# Patient Record
Sex: MALE | Race: WHITE | Employment: STUDENT | URBAN - METROPOLITAN AREA
[De-identification: names, ages, dates, MRNs, and addresses within clinical notes are randomized per-mention and may not be internally consistent; named-entity substitution may affect disease eponyms.]

---

## 2017-09-18 ENCOUNTER — OFFICE VISIT (OUTPATIENT)
Dept: INTERNAL MEDICINE CLINIC | Facility: CLINIC | Age: 22
End: 2017-09-18

## 2017-09-18 VITALS
DIASTOLIC BLOOD PRESSURE: 81 MMHG | WEIGHT: 148 LBS | SYSTOLIC BLOOD PRESSURE: 127 MMHG | HEIGHT: 74 IN | HEART RATE: 82 BPM | BODY MASS INDEX: 18.99 KG/M2

## 2017-09-18 DIAGNOSIS — Z00.00 ANNUAL PHYSICAL EXAM: Primary | ICD-10-CM

## 2017-09-18 PROCEDURE — 99385 PREV VISIT NEW AGE 18-39: CPT | Performed by: INTERNAL MEDICINE

## 2017-09-18 NOTE — PATIENT INSTRUCTIONS
Your physical today was normal.  Please obtain blood tests soon when you can. Continue to eat healthy and exercise regularly. Remember to get a flu shot every fall.

## 2017-09-18 NOTE — H&P
Starr Ramirez is a 19year old male who presents for a complete physical exam.   HPI:   He brings in a form for completion requiring biometric screening to include height weight waist circumference blood pressure fasting glucose and cholesterol profile.   H or chest pain  GI: No anorexia heartburn dysphagia nausea vomiting abdominal pain diarrhea constipation or rectal bleeding  : No urinary frequency dysuria or hematuria  MUSCULOSKELETAL: No leg swelling  NEURO: No headaches    EXAM:   GENERAL: Pleasant ma

## 2017-09-22 ENCOUNTER — MED REC SCAN ONLY (OUTPATIENT)
Dept: INTERNAL MEDICINE CLINIC | Facility: CLINIC | Age: 22
End: 2017-09-22

## 2017-10-02 ENCOUNTER — TELEPHONE (OUTPATIENT)
Dept: FAMILY MEDICINE CLINIC | Facility: CLINIC | Age: 22
End: 2017-10-02

## 2017-10-02 NOTE — TELEPHONE ENCOUNTER
Contacted pt and informed him that biometric screening form has not been faxed b/c he has opted out of that section, and just needs to have form faxed over.   Faxed over form to [employer] with confirmation

## 2018-12-01 ENCOUNTER — OFFICE VISIT (OUTPATIENT)
Dept: INTERNAL MEDICINE CLINIC | Facility: CLINIC | Age: 23
End: 2018-12-01
Payer: COMMERCIAL

## 2018-12-01 VITALS
WEIGHT: 147 LBS | HEIGHT: 74 IN | TEMPERATURE: 98 F | DIASTOLIC BLOOD PRESSURE: 79 MMHG | RESPIRATION RATE: 16 BRPM | BODY MASS INDEX: 18.87 KG/M2 | HEART RATE: 80 BPM | SYSTOLIC BLOOD PRESSURE: 121 MMHG

## 2018-12-01 DIAGNOSIS — J01.90 ACUTE NON-RECURRENT SINUSITIS, UNSPECIFIED LOCATION: Primary | ICD-10-CM

## 2018-12-01 PROCEDURE — 99213 OFFICE O/P EST LOW 20 MIN: CPT | Performed by: INTERNAL MEDICINE

## 2018-12-01 PROCEDURE — 99212 OFFICE O/P EST SF 10 MIN: CPT | Performed by: INTERNAL MEDICINE

## 2018-12-01 RX ORDER — AMOXICILLIN AND CLAVULANATE POTASSIUM 875; 125 MG/1; MG/1
1 TABLET, FILM COATED ORAL 2 TIMES DAILY WITH MEALS
Qty: 14 TABLET | Refills: 0 | Status: SHIPPED | OUTPATIENT
Start: 2018-12-01 | End: 2018-12-08

## 2018-12-01 NOTE — PATIENT INSTRUCTIONS
Please take generic Augmentin 875 mg twice daily with meals for 7 days. Treat symptoms with pseudoephedrine and Robitussin as needed. Call if no better.

## 2018-12-01 NOTE — PROGRESS NOTES
John Castaneda is a 23year old male.  Patient presents with:  Sinus Problem: clogged ears, dizzy, nausea, headache, cough with yellow mucus    HPI:   For approximately 2 weeks, he has had persistent symptoms of a \"sinus infection,\" with nasal congestion w treatment– pseudoephedrine and Robitussin as needed. Call if no better. The patient indicates understanding of these issues and agrees to the plan.     Kevyn Turcios MD  12/1/2018  10:51 AM

## 2019-01-02 ENCOUNTER — NURSE TRIAGE (OUTPATIENT)
Dept: OTHER | Age: 24
End: 2019-01-02

## 2019-01-02 ENCOUNTER — OFFICE VISIT (OUTPATIENT)
Dept: INTERNAL MEDICINE CLINIC | Facility: CLINIC | Age: 24
End: 2019-01-02
Payer: COMMERCIAL

## 2019-01-02 VITALS
TEMPERATURE: 98 F | BODY MASS INDEX: 19.38 KG/M2 | HEART RATE: 70 BPM | HEIGHT: 74 IN | WEIGHT: 151 LBS | DIASTOLIC BLOOD PRESSURE: 83 MMHG | SYSTOLIC BLOOD PRESSURE: 127 MMHG

## 2019-01-02 DIAGNOSIS — R21 RASH: Primary | ICD-10-CM

## 2019-01-02 PROCEDURE — 99212 OFFICE O/P EST SF 10 MIN: CPT | Performed by: INTERNAL MEDICINE

## 2019-01-02 PROCEDURE — 99213 OFFICE O/P EST LOW 20 MIN: CPT | Performed by: INTERNAL MEDICINE

## 2019-01-02 RX ORDER — METHYLPREDNISOLONE 4 MG/1
TABLET ORAL
Qty: 1 KIT | Refills: 0 | Status: SHIPPED | OUTPATIENT
Start: 2019-01-02 | End: 2019-01-08 | Stop reason: ALTCHOICE

## 2019-01-02 NOTE — TELEPHONE ENCOUNTER
Action Requested: Summary for Provider     []  Critical Lab, Recommendations Needed  [] Need Additional Advice  []   FYI    []   Need Orders  [] Need Medications Sent to Pharmacy  []  Other     SUMMARY:   Appointment made for today    The patient stated fo

## 2019-01-02 NOTE — PROGRESS NOTES
Patient ID: Jamal Zamarripa is a 20year old male. Patient presents with:  Rash: rash on arms and abdominal area. HISTORY OF PRESENT ILLNESS:   HPI  Patient presents for above.   Here with a 1 month history of pruritus and rash on his abdomen and upp Alcohol/week: 2.4 oz        Types: 4 Cans of beer per week        Comment: Occasional,  3-4 drinks 3-4 days weekly      Drug use: No      Sexual activity: Not on file    Other Topics      Concerns:        Not on file    Social History Narrative

## 2019-01-03 ENCOUNTER — LAB ENCOUNTER (OUTPATIENT)
Dept: LAB | Age: 24
End: 2019-01-03
Attending: INTERNAL MEDICINE
Payer: COMMERCIAL

## 2019-01-03 DIAGNOSIS — R21 RASH: ICD-10-CM

## 2019-01-03 LAB
BASOPHILS # BLD: 0 K/UL (ref 0–0.2)
BASOPHILS NFR BLD: 1 %
EOSINOPHIL # BLD: 0.1 K/UL (ref 0–0.7)
EOSINOPHIL NFR BLD: 2 %
ERYTHROCYTE [DISTWIDTH] IN BLOOD BY AUTOMATED COUNT: 13.2 % (ref 11–15)
HCT VFR BLD AUTO: 48.1 % (ref 41–52)
HGB BLD-MCNC: 16.4 G/DL (ref 13.5–17.5)
LYMPHOCYTES # BLD: 2 K/UL (ref 1–4)
LYMPHOCYTES NFR BLD: 34 %
MCH RBC QN AUTO: 30.2 PG (ref 27–32)
MCHC RBC AUTO-ENTMCNC: 34.1 G/DL (ref 32–37)
MCV RBC AUTO: 88.6 FL (ref 80–100)
MONOCYTES # BLD: 0.7 K/UL (ref 0–1)
MONOCYTES NFR BLD: 12 %
NEUTROPHILS # BLD AUTO: 3 K/UL (ref 1.8–7.7)
NEUTROPHILS NFR BLD: 52 %
PLATELET # BLD AUTO: 245 K/UL (ref 140–400)
PMV BLD AUTO: 9 FL (ref 7.4–10.3)
RBC # BLD AUTO: 5.43 M/UL (ref 4.5–5.9)
WBC # BLD AUTO: 5.9 K/UL (ref 4–11)

## 2019-01-03 PROCEDURE — 36415 COLL VENOUS BLD VENIPUNCTURE: CPT

## 2019-01-03 PROCEDURE — 85025 COMPLETE CBC W/AUTO DIFF WBC: CPT

## 2019-01-08 ENCOUNTER — OFFICE VISIT (OUTPATIENT)
Dept: INTERNAL MEDICINE CLINIC | Facility: CLINIC | Age: 24
End: 2019-01-08
Payer: COMMERCIAL

## 2019-01-08 VITALS
BODY MASS INDEX: 19.25 KG/M2 | HEIGHT: 74 IN | DIASTOLIC BLOOD PRESSURE: 83 MMHG | HEART RATE: 75 BPM | WEIGHT: 150 LBS | SYSTOLIC BLOOD PRESSURE: 147 MMHG | TEMPERATURE: 98 F

## 2019-01-08 DIAGNOSIS — R10.84 GENERALIZED ABDOMINAL PAIN: ICD-10-CM

## 2019-01-08 DIAGNOSIS — R21 RASH: Primary | ICD-10-CM

## 2019-01-08 PROCEDURE — 99212 OFFICE O/P EST SF 10 MIN: CPT | Performed by: INTERNAL MEDICINE

## 2019-01-08 PROCEDURE — 99214 OFFICE O/P EST MOD 30 MIN: CPT | Performed by: INTERNAL MEDICINE

## 2019-01-09 ENCOUNTER — LAB ENCOUNTER (OUTPATIENT)
Dept: LAB | Age: 24
End: 2019-01-09
Attending: INTERNAL MEDICINE
Payer: COMMERCIAL

## 2019-01-09 DIAGNOSIS — R10.84 GENERALIZED ABDOMINAL PAIN: ICD-10-CM

## 2019-01-09 LAB
ALBUMIN SERPL BCP-MCNC: 4.5 G/DL (ref 3.5–4.8)
ALBUMIN/GLOB SERPL: 2 {RATIO} (ref 1–2)
ALP SERPL-CCNC: 62 U/L (ref 32–100)
ALT SERPL-CCNC: 26 U/L (ref 17–63)
AMYLASE SERPL-CCNC: 75 U/L (ref 24–108)
ANION GAP SERPL CALC-SCNC: 12 MMOL/L (ref 0–18)
AST SERPL-CCNC: 23 U/L (ref 15–41)
BASOPHILS # BLD: 0 K/UL (ref 0–0.2)
BASOPHILS NFR BLD: 1 %
BILIRUB SERPL-MCNC: 0.6 MG/DL (ref 0.3–1.2)
BUN SERPL-MCNC: 9 MG/DL (ref 8–20)
BUN/CREAT SERPL: 8.8 (ref 10–20)
CALCIUM SERPL-MCNC: 9.3 MG/DL (ref 8.5–10.5)
CHLORIDE SERPL-SCNC: 102 MMOL/L (ref 95–110)
CO2 SERPL-SCNC: 25 MMOL/L (ref 22–32)
CREAT SERPL-MCNC: 1.02 MG/DL (ref 0.5–1.5)
EOSINOPHIL # BLD: 0.1 K/UL (ref 0–0.7)
EOSINOPHIL NFR BLD: 2 %
ERYTHROCYTE [DISTWIDTH] IN BLOOD BY AUTOMATED COUNT: 13.5 % (ref 11–15)
GLOBULIN PLAS-MCNC: 2.2 G/DL (ref 2.5–3.7)
GLUCOSE SERPL-MCNC: 88 MG/DL (ref 70–99)
HCT VFR BLD AUTO: 46.7 % (ref 41–52)
HGB BLD-MCNC: 16.2 G/DL (ref 13.5–17.5)
LIPASE SERPL-CCNC: 32 U/L (ref 22–51)
LYMPHOCYTES # BLD: 2.4 K/UL (ref 1–4)
LYMPHOCYTES NFR BLD: 35 %
MCH RBC QN AUTO: 30.6 PG (ref 27–32)
MCHC RBC AUTO-ENTMCNC: 34.7 G/DL (ref 32–37)
MCV RBC AUTO: 88.1 FL (ref 80–100)
MONOCYTES # BLD: 0.8 K/UL (ref 0–1)
MONOCYTES NFR BLD: 12 %
NEUTROPHILS # BLD AUTO: 3.4 K/UL (ref 1.8–7.7)
NEUTROPHILS NFR BLD: 50 %
OSMOLALITY UR CALC.SUM OF ELEC: 286 MOSM/KG (ref 275–295)
PATIENT FASTING: YES
PLATELET # BLD AUTO: 250 K/UL (ref 140–400)
PMV BLD AUTO: 8.2 FL (ref 7.4–10.3)
POTASSIUM SERPL-SCNC: 3.2 MMOL/L (ref 3.3–5.1)
PROT SERPL-MCNC: 6.7 G/DL (ref 5.9–8.4)
RBC # BLD AUTO: 5.3 M/UL (ref 4.5–5.9)
SODIUM SERPL-SCNC: 139 MMOL/L (ref 136–144)
WBC # BLD AUTO: 6.8 K/UL (ref 4–11)

## 2019-01-09 PROCEDURE — 80053 COMPREHEN METABOLIC PANEL: CPT

## 2019-01-09 PROCEDURE — 83690 ASSAY OF LIPASE: CPT

## 2019-01-09 PROCEDURE — 85025 COMPLETE CBC W/AUTO DIFF WBC: CPT

## 2019-01-09 PROCEDURE — 82150 ASSAY OF AMYLASE: CPT

## 2019-01-09 PROCEDURE — 36415 COLL VENOUS BLD VENIPUNCTURE: CPT

## 2019-01-09 NOTE — PROGRESS NOTES
Patient ID: John Castaneda is a 20year old male. Patient presents with: Follow - Up: 1 week follow up on rash. per patient no more itchy. Abdominal Pain: Stomach issues, unable to keep food down at times,  Affecting eating habits at times.  Ongoing iss Spouse name: Not on file      Number of children: Not on file      Years of education: Not on file      Highest education level: Not on file    Social Needs      Financial resource strain: Not on file      Food insecurity - worry: Not on file      Food ins

## 2019-01-16 ENCOUNTER — OFFICE VISIT (OUTPATIENT)
Dept: GASTROENTEROLOGY | Facility: CLINIC | Age: 24
End: 2019-01-16
Payer: COMMERCIAL

## 2019-01-16 VITALS
DIASTOLIC BLOOD PRESSURE: 65 MMHG | BODY MASS INDEX: 19.23 KG/M2 | SYSTOLIC BLOOD PRESSURE: 120 MMHG | HEIGHT: 74 IN | WEIGHT: 149.81 LBS | HEART RATE: 80 BPM

## 2019-01-16 DIAGNOSIS — R10.13 EPIGASTRIC DISCOMFORT: ICD-10-CM

## 2019-01-16 DIAGNOSIS — R15.2 FECAL URGENCY: Primary | ICD-10-CM

## 2019-01-16 PROCEDURE — 99243 OFF/OP CNSLTJ NEW/EST LOW 30: CPT | Performed by: NURSE PRACTITIONER

## 2019-01-16 PROCEDURE — 99212 OFFICE O/P EST SF 10 MIN: CPT | Performed by: NURSE PRACTITIONER

## 2019-02-20 ENCOUNTER — TELEPHONE (OUTPATIENT)
Dept: GASTROENTEROLOGY | Facility: CLINIC | Age: 24
End: 2019-02-20

## 2019-11-29 ENCOUNTER — OFFICE VISIT (OUTPATIENT)
Dept: INTERNAL MEDICINE CLINIC | Facility: CLINIC | Age: 24
End: 2019-11-29
Payer: COMMERCIAL

## 2019-11-29 VITALS
BODY MASS INDEX: 19.3 KG/M2 | HEIGHT: 74 IN | TEMPERATURE: 98 F | DIASTOLIC BLOOD PRESSURE: 80 MMHG | WEIGHT: 150.38 LBS | HEART RATE: 80 BPM | SYSTOLIC BLOOD PRESSURE: 114 MMHG

## 2019-11-29 DIAGNOSIS — J06.9 UPPER RESPIRATORY TRACT INFECTION, UNSPECIFIED TYPE: Primary | ICD-10-CM

## 2019-11-29 PROCEDURE — 99212 OFFICE O/P EST SF 10 MIN: CPT | Performed by: INTERNAL MEDICINE

## 2019-11-29 PROCEDURE — 99213 OFFICE O/P EST LOW 20 MIN: CPT | Performed by: INTERNAL MEDICINE

## 2019-11-29 RX ORDER — AZITHROMYCIN 250 MG/1
TABLET, FILM COATED ORAL
Qty: 6 TABLET | Refills: 0 | Status: SHIPPED | OUTPATIENT
Start: 2019-11-29 | End: 2020-08-19 | Stop reason: ALTCHOICE

## 2019-11-29 NOTE — PATIENT INSTRUCTIONS
1.  Take antibiotics as prescribed. 2.  Symptomatic treatment with hot showers, steam inhalation, fluids, decongestants, Flonase.

## 2019-11-29 NOTE — PROGRESS NOTES
Patient ID: Jamal Zamarripa is a 20year old male.   Patient presents with:  Flu: Pt states x2 days ago aching, coughing, headache, congested       HISTORY OF PRESENT ILLNESS:   HPI  2 day(s) ago  Fever - No, Tmax (N/A)  Cough - Yes, productive - No, color - Occasional,  3-4 drinks 3-4 days weekly      Drug use: No      Sexual activity: Not on file    Lifestyle      Physical activity:        Days per week: Not on file        Minutes per session: Not on file      Stress: Not on file    Relationships      Social Upper respiratory tract infection, unspecified type  · azithromycin 250 MG Oral Tab; Take two tablets by mouth today, then one daily. Dispense: 6 tablet;  Refill: 0  · Symptomatic treatment with hot showers, stimulation, fluids, salt water gargling, Flonas

## 2020-08-18 ENCOUNTER — PATIENT MESSAGE (OUTPATIENT)
Dept: INTERNAL MEDICINE CLINIC | Facility: CLINIC | Age: 25
End: 2020-08-18

## 2020-08-18 ENCOUNTER — NURSE TRIAGE (OUTPATIENT)
Dept: INTERNAL MEDICINE CLINIC | Facility: CLINIC | Age: 25
End: 2020-08-18

## 2020-08-19 ENCOUNTER — OFFICE VISIT (OUTPATIENT)
Dept: INTERNAL MEDICINE CLINIC | Facility: CLINIC | Age: 25
End: 2020-08-19
Payer: COMMERCIAL

## 2020-08-19 VITALS
HEIGHT: 74 IN | TEMPERATURE: 98 F | BODY MASS INDEX: 19.12 KG/M2 | SYSTOLIC BLOOD PRESSURE: 114 MMHG | HEART RATE: 71 BPM | WEIGHT: 149 LBS | DIASTOLIC BLOOD PRESSURE: 77 MMHG

## 2020-08-19 DIAGNOSIS — B30.9 VIRAL CONJUNCTIVITIS: Primary | ICD-10-CM

## 2020-08-19 PROCEDURE — 99213 OFFICE O/P EST LOW 20 MIN: CPT | Performed by: INTERNAL MEDICINE

## 2020-08-19 PROCEDURE — 99212 OFFICE O/P EST SF 10 MIN: CPT | Performed by: INTERNAL MEDICINE

## 2020-08-19 PROCEDURE — 3078F DIAST BP <80 MM HG: CPT | Performed by: INTERNAL MEDICINE

## 2020-08-19 PROCEDURE — 3074F SYST BP LT 130 MM HG: CPT | Performed by: INTERNAL MEDICINE

## 2020-08-19 PROCEDURE — 3008F BODY MASS INDEX DOCD: CPT | Performed by: INTERNAL MEDICINE

## 2020-08-19 NOTE — PROGRESS NOTES
Patient ID: Anastasiia Junior is a 30year old male. Patient presents with:  Eye Problem: Developed blurred vision on right eye yesterday, redness, swelling. x1 day        HISTORY OF PRESENT ILLNESS:   HPI  Patient presents for above.   Here with a 1 day hist drinks        Types: 4 Cans of beer per week        Comment: Occasional,  3-4 drinks 3-4 days weekly      Drug use: No      Sexual activity: Not on file    Lifestyle      Physical activity:        Days per week: Not on file        Minutes per session: Not

## 2020-08-19 NOTE — TELEPHONE ENCOUNTER
Action Requested: Summary for Provider     []  Critical Lab, Recommendations Needed  [] Need Additional Advice  [x]   FYI    []   Need Orders  [] Need Medications Sent to Pharmacy  []  Other     SUMMARY: FYI booked patient an appt with you later mike duque

## 2020-08-19 NOTE — TELEPHONE ENCOUNTER
From: Freda Chen  To: Soha Mayorga MD  Sent: 8/18/2020 7:28 PM CDT  Subject: Non-Urgent Medical Question    Kaylie Bob,    [facility] you are well. This morning I woke up with some pink eye symptoms in my right eye.  Slightly blurred vision, drainage, redness and

## 2020-08-19 NOTE — TELEPHONE ENCOUNTER
RN pls call pt and triage or offer ov if needed, thanks.        Future Appointments   Date Time Provider Neil Sanders   8/25/2020  2:00 PM Felicity Verma MD [address] [place] [address]

## 2020-08-19 NOTE — TELEPHONE ENCOUNTER
From: Hayley Paige  To: Corby Clarke MD  Sent: 8/18/2020  7:28 PM CDT  Subject: Non-Urgent Medical Question    Kaylie Bob,    [facility] you are well. This morning I woke up with some pink eye symptoms in my right eye.  Slightly blurred vision, drainage, redness and

## 2020-08-19 NOTE — PATIENT INSTRUCTIONS
Viral Conjunctivitis    Viral conjunctivitis is sometimes called pink eye. It's a common infection of the eye. It's very contagious. Touching the infected eye and then touching another person spreads this infection.  It can also be spread from one eye to the infection. · This illness is contagious during the first week. Children with this illness should be kept out of day care and school until the redness clears. Follow-up care  Follow up with your healthcare provider, or as advised.   When to seek medi

## 2020-08-19 NOTE — TELEPHONE ENCOUNTER
See TE 8-18-20    Future Appointments   Date Time Provider Neil Marilyn   8/25/2020  2:00 PM Mehran Michele MD [facility] [place]

## 2021-06-10 ENCOUNTER — OFFICE VISIT (OUTPATIENT)
Dept: INTERNAL MEDICINE CLINIC | Facility: CLINIC | Age: 26
End: 2021-06-10
Payer: COMMERCIAL

## 2021-06-10 VITALS
WEIGHT: 153 LBS | HEIGHT: 74 IN | BODY MASS INDEX: 19.64 KG/M2 | SYSTOLIC BLOOD PRESSURE: 122 MMHG | HEART RATE: 66 BPM | DIASTOLIC BLOOD PRESSURE: 80 MMHG

## 2021-06-10 DIAGNOSIS — L65.9 ALOPECIA: Primary | ICD-10-CM

## 2021-06-10 PROCEDURE — 3008F BODY MASS INDEX DOCD: CPT | Performed by: INTERNAL MEDICINE

## 2021-06-10 PROCEDURE — 3079F DIAST BP 80-89 MM HG: CPT | Performed by: INTERNAL MEDICINE

## 2021-06-10 PROCEDURE — 99213 OFFICE O/P EST LOW 20 MIN: CPT | Performed by: INTERNAL MEDICINE

## 2021-06-10 PROCEDURE — 3074F SYST BP LT 130 MM HG: CPT | Performed by: INTERNAL MEDICINE

## 2021-06-10 RX ORDER — FINASTERIDE 1 MG/1
1 TABLET, FILM COATED ORAL DAILY
Qty: 90 TABLET | Refills: 0 | Status: SHIPPED | OUTPATIENT
Start: 2021-06-10 | End: 2021-09-04

## 2021-06-10 NOTE — PATIENT INSTRUCTIONS
Finasteride Oral Tablet 1 mg  Uses  For hair loss. Instructions  This medicine may be taken with or without food. Keep the medicine at room temperature. Avoid heat and direct light.   It is important that you keep taking each dose of this medicine on ti Symptoms can include difficulty breathing, skin rash, itching, swelling, or severe dizziness. If you notice any of these symptoms, seek medical help quickly.   Extra  Please speak with your doctor, nurse, or pharmacist if you have any questions about this m

## 2021-06-10 NOTE — PROGRESS NOTES
Patient ID: So Campos is a 30year old male. Patient presents with:  Hair/Scalp Problem: Pt reports concerns about hair line and thinning of hair x2 years. HISTORY OF PRESENT ILLNESS:   HPI  Patient presents for above.   Here for progressively Drug use: No      Sexual activity: Not on file    Other Topics      Concerns:        Not on file    Social History Narrative      Not on file    Social Determinants of Health  Financial Resource Strain:       Difficulty of Paying Living Expenses:   Food correct errors during dictation discrepancies may still exist.    Juana Oconnor MD  6/10/2021

## 2021-09-04 DIAGNOSIS — L65.9 ALOPECIA: ICD-10-CM

## 2021-09-04 RX ORDER — FINASTERIDE 1 MG/1
1 TABLET, FILM COATED ORAL DAILY
Qty: 90 TABLET | Refills: 3 | Status: SHIPPED | OUTPATIENT
Start: 2021-09-04 | End: 2022-03-04 | Stop reason: ALTCHOICE

## 2022-03-04 ENCOUNTER — LAB ENCOUNTER (OUTPATIENT)
Dept: LAB | Age: 27
End: 2022-03-04
Attending: INTERNAL MEDICINE
Payer: COMMERCIAL

## 2022-03-04 ENCOUNTER — OFFICE VISIT (OUTPATIENT)
Dept: INTERNAL MEDICINE CLINIC | Facility: CLINIC | Age: 27
End: 2022-03-04
Payer: COMMERCIAL

## 2022-03-04 VITALS
HEART RATE: 74 BPM | DIASTOLIC BLOOD PRESSURE: 68 MMHG | BODY MASS INDEX: 20.3 KG/M2 | WEIGHT: 158.19 LBS | HEIGHT: 74 IN | SYSTOLIC BLOOD PRESSURE: 116 MMHG

## 2022-03-04 DIAGNOSIS — Z00.00 ANNUAL PHYSICAL EXAM: Primary | ICD-10-CM

## 2022-03-04 DIAGNOSIS — Z00.00 ANNUAL PHYSICAL EXAM: ICD-10-CM

## 2022-03-04 DIAGNOSIS — F43.0 STRESS DISORDER, ACUTE: ICD-10-CM

## 2022-03-04 DIAGNOSIS — L65.9 ALOPECIA: ICD-10-CM

## 2022-03-04 DIAGNOSIS — J30.89 PERENNIAL ALLERGIC RHINITIS: ICD-10-CM

## 2022-03-04 LAB
ALBUMIN SERPL-MCNC: 4.4 G/DL (ref 3.4–5)
ALBUMIN/GLOB SERPL: 1.7 {RATIO} (ref 1–2)
ALP LIVER SERPL-CCNC: 78 U/L
ALT SERPL-CCNC: 24 U/L
ANION GAP SERPL CALC-SCNC: 6 MMOL/L (ref 0–18)
AST SERPL-CCNC: 18 U/L (ref 15–37)
BASOPHILS # BLD AUTO: 0.04 X10(3) UL (ref 0–0.2)
BASOPHILS NFR BLD AUTO: 0.8 %
BILIRUB SERPL-MCNC: 0.5 MG/DL (ref 0.1–2)
BUN BLD-MCNC: 12 MG/DL (ref 7–18)
BUN/CREAT SERPL: 11.8 (ref 10–20)
CALCIUM BLD-MCNC: 9.5 MG/DL (ref 8.5–10.1)
CHLORIDE SERPL-SCNC: 105 MMOL/L (ref 98–112)
CHOLEST SERPL-MCNC: 166 MG/DL (ref ?–200)
CO2 SERPL-SCNC: 28 MMOL/L (ref 21–32)
CREAT BLD-MCNC: 1.02 MG/DL
DEPRECATED RDW RBC AUTO: 41.1 FL (ref 35.1–46.3)
EOSINOPHIL # BLD AUTO: 0.08 X10(3) UL (ref 0–0.7)
EOSINOPHIL NFR BLD AUTO: 1.5 %
ERYTHROCYTE [DISTWIDTH] IN BLOOD BY AUTOMATED COUNT: 12.6 % (ref 11–15)
FASTING PATIENT LIPID ANSWER: NO
FASTING STATUS PATIENT QL REPORTED: NO
GLOBULIN PLAS-MCNC: 2.6 G/DL (ref 2.8–4.4)
GLUCOSE BLD-MCNC: 90 MG/DL (ref 70–99)
HCT VFR BLD AUTO: 47.8 %
HDLC SERPL-MCNC: 75 MG/DL (ref 40–59)
HGB BLD-MCNC: 16 G/DL
IMM GRANULOCYTES NFR BLD: 0.6 %
LDLC SERPL CALC-MCNC: 80 MG/DL (ref ?–100)
LYMPHOCYTES # BLD AUTO: 1.59 X10(3) UL (ref 1–4)
LYMPHOCYTES NFR BLD AUTO: 30 %
MCH RBC QN AUTO: 30 PG (ref 26–34)
MCHC RBC AUTO-ENTMCNC: 33.5 G/DL (ref 31–37)
MCV RBC AUTO: 89.5 FL
MONOCYTES # BLD AUTO: 0.5 X10(3) UL (ref 0.1–1)
MONOCYTES NFR BLD AUTO: 9.4 %
NEUTROPHILS # BLD AUTO: 3.06 X10 (3) UL (ref 1.5–7.7)
NEUTROPHILS # BLD AUTO: 3.06 X10(3) UL (ref 1.5–7.7)
NEUTROPHILS NFR BLD AUTO: 57.7 %
NONHDLC SERPL-MCNC: 91 MG/DL (ref ?–130)
OSMOLALITY SERPL CALC.SUM OF ELEC: 287 MOSM/KG (ref 275–295)
PLATELET # BLD AUTO: 271 10(3)UL (ref 150–450)
POTASSIUM SERPL-SCNC: 3.9 MMOL/L (ref 3.5–5.1)
PROT SERPL-MCNC: 7 G/DL (ref 6.4–8.2)
SODIUM SERPL-SCNC: 139 MMOL/L (ref 136–145)
T3FREE SERPL-MCNC: 3.04 PG/ML (ref 2.4–4.2)
T4 FREE SERPL-MCNC: 1.3 NG/DL (ref 0.8–1.7)
TRIGL SERPL-MCNC: 54 MG/DL (ref 30–149)
TSI SER-ACNC: 0.34 MIU/ML (ref 0.36–3.74)
VLDLC SERPL CALC-MCNC: 8 MG/DL (ref 0–30)
WBC # BLD AUTO: 5.3 X10(3) UL (ref 4–11)

## 2022-03-04 PROCEDURE — 84439 ASSAY OF FREE THYROXINE: CPT

## 2022-03-04 PROCEDURE — 3074F SYST BP LT 130 MM HG: CPT | Performed by: INTERNAL MEDICINE

## 2022-03-04 PROCEDURE — 80061 LIPID PANEL: CPT

## 2022-03-04 PROCEDURE — 84443 ASSAY THYROID STIM HORMONE: CPT

## 2022-03-04 PROCEDURE — 3008F BODY MASS INDEX DOCD: CPT | Performed by: INTERNAL MEDICINE

## 2022-03-04 PROCEDURE — 99395 PREV VISIT EST AGE 18-39: CPT | Performed by: INTERNAL MEDICINE

## 2022-03-04 PROCEDURE — 80053 COMPREHEN METABOLIC PANEL: CPT

## 2022-03-04 PROCEDURE — 85025 COMPLETE CBC W/AUTO DIFF WBC: CPT

## 2022-03-04 PROCEDURE — 3078F DIAST BP <80 MM HG: CPT | Performed by: INTERNAL MEDICINE

## 2022-03-04 PROCEDURE — 84481 FREE ASSAY (FT-3): CPT

## 2022-03-04 PROCEDURE — 36415 COLL VENOUS BLD VENIPUNCTURE: CPT

## 2022-03-29 NOTE — TELEPHONE ENCOUNTER
From: Rob Dickens  To: Modesta Henderson MD  Sent: 3/28/2022 7:47 PM CDT  Subject: Anxiety/Stress    Hi Doc,  After my last visit we had talked about my stress and anxiety and how it has been really affecting a lot of my day to day. I have gone through a few therapists and do yoga daily so I am familiar with that path, but this has been going on long enough that I am interested in trying an anxiety/stress medication.  I would greatly appreciate a recommendation on how to start that path, where to go, who can assist.     Thank you

## 2022-06-08 DIAGNOSIS — F43.0 STRESS DISORDER, ACUTE: ICD-10-CM

## 2022-06-08 RX ORDER — ESCITALOPRAM OXALATE 5 MG/1
5 TABLET ORAL DAILY
Qty: 30 TABLET | Refills: 0 | Status: SHIPPED | OUTPATIENT
Start: 2022-06-08

## 2022-07-12 DIAGNOSIS — F43.0 STRESS DISORDER, ACUTE: ICD-10-CM

## 2022-07-12 RX ORDER — ESCITALOPRAM OXALATE 5 MG/1
5 TABLET ORAL DAILY
Qty: 30 TABLET | Refills: 0 | Status: SHIPPED | OUTPATIENT
Start: 2022-07-12

## 2022-08-08 DIAGNOSIS — F43.0 STRESS DISORDER, ACUTE: ICD-10-CM

## 2022-08-08 RX ORDER — ESCITALOPRAM OXALATE 5 MG/1
5 TABLET ORAL DAILY
Qty: 90 TABLET | Refills: 0 | Status: SHIPPED | OUTPATIENT
Start: 2022-08-08 | End: 2022-11-08

## 2022-08-08 NOTE — TELEPHONE ENCOUNTER
Call center please call and schedule an appointment. Thank You. Darwin Labt message sent to the patient.       Authorized by: Ariana Malave MD     Non-formulary For: Stress disorder, acute    To pharmacy: 6/8/2022- please schedule office visit for further refills

## 2022-10-11 ENCOUNTER — PATIENT MESSAGE (OUTPATIENT)
Dept: INTERNAL MEDICINE CLINIC | Facility: CLINIC | Age: 27
End: 2022-10-11

## 2022-10-17 NOTE — TELEPHONE ENCOUNTER
Patient is willing to proceed with Finasteride as previously advised. Verified pharmacy. [facility], [address] Magda Coup  [facility], [phone], [address]    Dr. Monnie Goodpasture please see pended Rx and advise.

## 2022-10-18 RX ORDER — FINASTERIDE 5 MG/1
5 TABLET, FILM COATED ORAL DAILY
Qty: 90 TABLET | Refills: 3 | Status: SHIPPED | OUTPATIENT
Start: 2022-10-18 | End: 2022-10-19

## 2022-10-19 RX ORDER — FINASTERIDE 5 MG/1
5 TABLET, FILM COATED ORAL DAILY
Qty: 90 TABLET | Refills: 3 | Status: SHIPPED | OUTPATIENT
Start: 2022-10-19 | End: 2023-10-14

## 2022-11-08 DIAGNOSIS — F43.0 STRESS DISORDER, ACUTE: ICD-10-CM

## 2022-11-08 RX ORDER — ESCITALOPRAM OXALATE 5 MG/1
5 TABLET ORAL DAILY
Qty: 90 TABLET | Refills: 0 | Status: SHIPPED | OUTPATIENT
Start: 2022-11-08

## 2023-01-16 RX ORDER — FINASTERIDE 5 MG/1
5 TABLET, FILM COATED ORAL DAILY
Qty: 90 TABLET | Refills: 2 | Status: SHIPPED | OUTPATIENT
Start: 2023-01-16 | End: 2024-01-11

## 2023-01-16 NOTE — TELEPHONE ENCOUNTER
Refill passed per [place] [employer] [place], [facility] protocol. Requested Prescriptions   Pending Prescriptions Disp Refills    finasteride 5 MG Oral Tab 90 tablet 3     Sig: Take 1 tablet (5 mg total) by mouth daily.        Genitourinary Medications Passed - 1/16/2023  9:35 AM        Passed - Patient does not have pulmonary hypertension on problem list        Passed - In person appointment or virtual visit in the past 12 mos or appointment in next 3 mos     Recent Outpatient Visits              8 months ago Stress disorder, acute    Rajan Corbin MD    Telemedicine    10 months ago Annual physical exam    Joycelyn Corbin MD    Office Visit    1 year ago Alopecia    Rajan Corbin MD    Office Visit    2 years ago Viral conjunctivitis    Ba Story MD    Office Visit    3 years ago Upper respiratory tract infection, unspecified type    Joycelyn Corbin MD    Office Visit          Future Appointments         Provider Department Appt Notes    In 1 month Elizabeth Gomes MD [address], [address]                     Future Appointments         Provider Department Appt Notes    In 1 month Elizabeth Gomes MD [address]            Recent Outpatient Visits              8 months ago Stress disorder, acute    Rajan Corbin MD    Telemedicine    10 months ago Annual physical exam    Rajan Corbin MD    Office Visit    1 year ago Alopecia    Rajan Corbin MD    Office Visit    2 years ago Viral conjunctivitis    [place] [facility], Valeriano Christensen MD    Office Visit    3 years ago Upper respiratory tract infection, unspecified type    [address] [facility], Valeriano Christensen MD    Office Visit

## 2023-04-25 DIAGNOSIS — F43.0 STRESS DISORDER, ACUTE: ICD-10-CM

## 2023-04-26 RX ORDER — ESCITALOPRAM OXALATE 5 MG/1
TABLET ORAL
Qty: 90 TABLET | Refills: 0 | Status: SHIPPED | OUTPATIENT
Start: 2023-04-26

## 2023-04-26 NOTE — TELEPHONE ENCOUNTER
Please review. Protocol failed / No protocol.     Requested Prescriptions   Pending Prescriptions Disp Refills    ESCITALOPRAM 5 MG Oral Tab [Pharmacy Med Name: ESCITALOPRAM 5MG TABLETS] 90 tablet 0     Sig: TAKE 1 TABLET(5 MG) BY MOUTH DAILY       Psychiatric Non-Scheduled (Anti-Anxiety) Failed - 4/25/2023  3:32 PM        Failed - In person appointment or virtual visit in the past 6 mos or appointment in next 3 mos     Recent Outpatient Visits              11 months ago Stress disorder, acute    Rajan Bustamante MD    Telemedicine    1 year ago Annual physical exam    Arnaldo Bustamante MD    Office Visit    1 year ago Alopecia    Rajan Bustamante MD    Office Visit    2 years ago Viral conjunctivitis    Arnaldo Bustamante MD    Office Visit    3 years ago Upper respiratory tract infection, unspecified type    Arnaldo Bustamante MD    Office Visit                           Recent Outpatient Visits              11 months ago Stress disorder, acute    Rajan Bustamante MD    Telemedicine    1 year ago Annual physical exam    Cecille Ledesma MD    Office Visit    1 year ago Darol Eisenmenger, [place] Nanette Bush, MD    Office Visit    2 years ago Viral conjunctivitis    Rajan Bustamante MD    Office Visit    3 years ago Upper respiratory tract infection, unspecified type    Rajan Bustamante MD    Office Visit

## 2023-08-03 ENCOUNTER — TELEPHONE (OUTPATIENT)
Dept: INTERNAL MEDICINE CLINIC | Facility: CLINIC | Age: 28
End: 2023-08-03

## 2023-08-03 DIAGNOSIS — F43.0 STRESS DISORDER, ACUTE: ICD-10-CM

## 2023-08-04 DIAGNOSIS — F43.0 STRESS DISORDER, ACUTE: ICD-10-CM

## 2023-08-04 RX ORDER — ESCITALOPRAM OXALATE 5 MG/1
5 TABLET ORAL DAILY
Qty: 30 TABLET | Refills: 0 | Status: SHIPPED | OUTPATIENT
Start: 2023-08-04

## 2023-08-04 NOTE — TELEPHONE ENCOUNTER
Please review. Protocol failed / Has no protocol. [employer] message sent to patient to schedule an office visit with PCP. Will also make a phone attempt.      Requested Prescriptions   Pending Prescriptions Disp Refills    ESCITALOPRAM 5 MG Oral Tab [Pharmacy Med Name: ESCITALOPRAM 5MG TABLETS] 90 tablet 0     Sig: TAKE 1 TABLET(5 MG) BY MOUTH DAILY       Psychiatric Non-Scheduled (Anti-Anxiety) Failed - 8/3/2023  5:52 AM        Failed - In person appointment or virtual visit in the past 6 mos or appointment in next 3 mos     Recent Outpatient Visits              1 year ago Stress disorder, acute    [address] [facility] Nancy Gallo MD    Telemedicine    1 year ago Annual physical exam    [address] [facility] Nancy Gallo MD    Office Visit    2 years ago Rajan Freed MD    Office Visit    2 years ago Viral conjunctivitis    [address] [facility] Nancy Gallo MD    Office Visit    3 years ago Upper respiratory tract infection, unspecified type    [address] [facility] Nancy Boyer MD    Office Visit                            Recent Outpatient Visits              1 year ago Stress disorder, acute    [address] [facility] Rajan Gallo MD    Telemedicine    1 year ago Annual physical exam    Desirae Oh MD    Office Visit    2 years ago Rajan Freed MD    Office Visit    2 years ago [facility]iv[facility]    3 years ago Upper respiratory tract infection, unspecified type    [address] [facility] Rajan Gallo MD    Office Visit

## 2023-08-06 RX ORDER — ESCITALOPRAM OXALATE 5 MG/1
5 TABLET ORAL DAILY
Qty: 90 TABLET | Refills: 0 | OUTPATIENT
Start: 2023-08-06

## 2023-09-22 DIAGNOSIS — F43.0 STRESS DISORDER, ACUTE: ICD-10-CM

## 2023-09-24 RX ORDER — ESCITALOPRAM OXALATE 5 MG/1
5 TABLET ORAL DAILY
Qty: 30 TABLET | Refills: 0 | OUTPATIENT
Start: 2023-09-24

## 2023-09-25 ENCOUNTER — TELEPHONE (OUTPATIENT)
Dept: INTERNAL MEDICINE CLINIC | Facility: CLINIC | Age: 28
End: 2023-09-25

## 2023-09-25 NOTE — TELEPHONE ENCOUNTER
Spoke, with the patient and informed him of the message below Pt does want to see Dr. Marlo Bettencourt for his physical and is in town this week. Message sent under appointment request to see if the doctor would like to add the patient to the schedule this week.

## 2023-09-28 NOTE — TELEPHONE ENCOUNTER
1st call - LMTCB see message below.
9/29/2023 is the res24 slot.
Appointment on file for 9/29
Call center please contact Pt and schedule an OV ok to use RES24 slot .
Phoned the patient to schedule an appointment for a physical per the doctors request. See previous encounter for refills. Pt is in town this week and would like to know if Dr. Tom Mireles can add him to the schedule for a physical. The first available with Dr. Tom Mireles is not until 10-4-23. Would the doctor like to add patient to the schedule this week? If so which date and time?
Please read message below
none

## 2023-09-29 ENCOUNTER — OFFICE VISIT (OUTPATIENT)
Dept: INTERNAL MEDICINE CLINIC | Facility: CLINIC | Age: 28
End: 2023-09-29

## 2023-09-29 VITALS
HEIGHT: 74 IN | RESPIRATION RATE: 14 BRPM | SYSTOLIC BLOOD PRESSURE: 135 MMHG | BODY MASS INDEX: 22.33 KG/M2 | WEIGHT: 174 LBS | DIASTOLIC BLOOD PRESSURE: 77 MMHG | HEART RATE: 81 BPM | OXYGEN SATURATION: 95 %

## 2023-09-29 DIAGNOSIS — F43.0 STRESS DISORDER, ACUTE: ICD-10-CM

## 2023-09-29 DIAGNOSIS — Z00.00 ANNUAL PHYSICAL EXAM: Primary | ICD-10-CM

## 2023-09-29 DIAGNOSIS — J45.20 MILD INTERMITTENT ASTHMA WITHOUT COMPLICATION: ICD-10-CM

## 2023-09-29 DIAGNOSIS — Z23 NEED FOR VACCINATION: ICD-10-CM

## 2023-09-29 PROCEDURE — 99395 PREV VISIT EST AGE 18-39: CPT | Performed by: INTERNAL MEDICINE

## 2023-09-29 PROCEDURE — 90677 PCV20 VACCINE IM: CPT | Performed by: INTERNAL MEDICINE

## 2023-09-29 PROCEDURE — 90471 IMMUNIZATION ADMIN: CPT | Performed by: INTERNAL MEDICINE

## 2023-09-29 PROCEDURE — 3078F DIAST BP <80 MM HG: CPT | Performed by: INTERNAL MEDICINE

## 2023-09-29 PROCEDURE — 3008F BODY MASS INDEX DOCD: CPT | Performed by: INTERNAL MEDICINE

## 2023-09-29 PROCEDURE — 3075F SYST BP GE 130 - 139MM HG: CPT | Performed by: INTERNAL MEDICINE

## 2023-09-29 RX ORDER — ESCITALOPRAM OXALATE 5 MG/1
5 TABLET ORAL DAILY
Qty: 90 TABLET | Refills: 3 | Status: SHIPPED | OUTPATIENT
Start: 2023-09-29

## (undated) NOTE — LETTER
02/20/19        Zafar Bates  [address] [facility] [address]      Dear Caryl Taylor,    [address] [facility] records indicate that you have outstanding lab work and or testing that was ordered for you and has not yet been completed:    H. Pylori, Stool  Sed Rate  C